# Patient Record
Sex: MALE | Race: WHITE | NOT HISPANIC OR LATINO | URBAN - METROPOLITAN AREA
[De-identification: names, ages, dates, MRNs, and addresses within clinical notes are randomized per-mention and may not be internally consistent; named-entity substitution may affect disease eponyms.]

---

## 2017-10-19 ENCOUNTER — IMPORTED ENCOUNTER (OUTPATIENT)
Dept: URBAN - METROPOLITAN AREA CLINIC 1 | Facility: CLINIC | Age: 37
End: 2017-10-19

## 2017-10-19 PROBLEM — H52.13: Noted: 2017-10-19

## 2017-10-19 PROCEDURE — S0621 ROUTINE OPHTHALMOLOGICAL EXA: HCPCS

## 2017-10-19 NOTE — PATIENT DISCUSSION
1. Myopia: Rx was given for correction if indicated and requested. Return for an appointment in 1 year 40/cc with Dr. Nichole Mesa.

## 2019-04-10 ENCOUNTER — IMPORTED ENCOUNTER (OUTPATIENT)
Dept: URBAN - METROPOLITAN AREA CLINIC 1 | Facility: CLINIC | Age: 39
End: 2019-04-10

## 2019-04-10 PROBLEM — H52.13: Noted: 2019-04-10

## 2019-04-10 PROBLEM — H11.151: Noted: 2019-04-10

## 2019-04-10 PROCEDURE — S0621 ROUTINE OPHTHALMOLOGICAL EXA: HCPCS

## 2019-04-10 NOTE — PATIENT DISCUSSION
1. Myopia: Rx was given for correction if indicated and requested. 2. Pinguecula: Use of sunglasses when exposed to UV light and observation is recommended. Finalized CTL Rx today. Return for an appointment in 1 year for a 36  with Dr. Lynn Johnson.

## 2020-01-22 ENCOUNTER — IMPORTED ENCOUNTER (OUTPATIENT)
Dept: URBAN - METROPOLITAN AREA CLINIC 1 | Facility: CLINIC | Age: 40
End: 2020-01-22

## 2020-01-22 PROBLEM — H52.13: Noted: 2020-01-22

## 2020-01-22 PROCEDURE — S0621 ROUTINE OPHTHALMOLOGICAL EXA: HCPCS

## 2020-01-22 NOTE — PATIENT DISCUSSION
1. Myopia: Rx was given for correction if indicated and requested. 2. Pinguecula: Use of sunglasses when exposed to UV light and observation is recommended. Finalized CTL Rx today. Return for an appointment in 1 year 40/cc with Dr. Raymond Connor.

## 2021-06-21 ENCOUNTER — IMPORTED ENCOUNTER (OUTPATIENT)
Dept: URBAN - METROPOLITAN AREA CLINIC 1 | Facility: CLINIC | Age: 41
End: 2021-06-21

## 2021-06-21 PROBLEM — H52.222: Noted: 2021-06-21

## 2021-06-21 PROBLEM — H44.23: Noted: 2021-06-21

## 2021-06-21 PROBLEM — H52.223: Noted: 2021-06-21

## 2021-06-21 PROCEDURE — S0621 ROUTINE OPHTHALMOLOGICAL EXA: HCPCS

## 2021-06-21 NOTE — PATIENT DISCUSSION
1.  High Myopia w/ Astigmatism OU -- Rx was given for correction if indicated and requested. 2. Pinguecula OU -- Recommended wearing sunglasses when exposed to UV Light. Finalized CTL Rx and given to patient ([employer])Return for an appointment in 1 year 40/cc with Dr. Erlinda Sesay.

## 2022-04-02 ASSESSMENT — VISUAL ACUITY
OS_SC: 20/20
OD_SC: 20/20
OD_SC: 20/20
OS_SC: 20/20
OD_CC: J1+
OD_SC: 20/20
OS_SC: 20/20
OS_CC: J1+
OS_SC: 20/20
OD_SC: 20/20

## 2022-04-02 ASSESSMENT — TONOMETRY
OS_IOP_MMHG: 19
OS_IOP_MMHG: 18
OD_IOP_MMHG: 17
OS_IOP_MMHG: 18
OD_IOP_MMHG: 18
OD_IOP_MMHG: 19
OS_IOP_MMHG: 18

## 2023-01-13 ENCOUNTER — COMPREHENSIVE EXAM (OUTPATIENT)
Dept: URBAN - METROPOLITAN AREA CLINIC 1 | Facility: CLINIC | Age: 43
End: 2023-01-13

## 2023-01-13 DIAGNOSIS — H52.223: ICD-10-CM

## 2023-01-13 DIAGNOSIS — H44.23: ICD-10-CM

## 2023-01-13 PROCEDURE — 92014 COMPRE OPH EXAM EST PT 1/>: CPT

## 2023-01-13 PROCEDURE — 92015 DETERMINE REFRACTIVE STATE: CPT

## 2023-01-13 PROCEDURE — 92310 CONTACT LENS FITTING OU: CPT

## 2023-01-13 ASSESSMENT — TONOMETRY
OS_IOP_MMHG: 17
OD_IOP_MMHG: 17

## 2023-01-13 ASSESSMENT — VISUAL ACUITY
OD_CC: 20/20
OS_CC: J1+
OS_CC: 20/20
OD_CC: J1+

## 2023-10-27 ENCOUNTER — COMPREHENSIVE EXAM (OUTPATIENT)
Dept: URBAN - METROPOLITAN AREA CLINIC 2 | Facility: CLINIC | Age: 43
End: 2023-10-27

## 2023-10-27 DIAGNOSIS — H52.222: ICD-10-CM

## 2023-10-27 DIAGNOSIS — H44.23: ICD-10-CM

## 2023-10-27 DIAGNOSIS — H04.123: ICD-10-CM

## 2023-10-27 DIAGNOSIS — H11.153: ICD-10-CM

## 2023-10-27 PROCEDURE — 92014 COMPRE OPH EXAM EST PT 1/>: CPT

## 2023-10-27 PROCEDURE — 92310-2 LEVEL 2 CONTACT LENS MANAGEMENT

## 2023-10-27 PROCEDURE — 92015 DETERMINE REFRACTIVE STATE: CPT

## 2023-10-27 ASSESSMENT — KERATOMETRY
OS_K2POWER_DIOPTERS: 45.75
OS_AXISANGLE2_DEGREES: 075
OS_AXISANGLE_DEGREES: 165
OS_K1POWER_DIOPTERS: 43.50
OD_K1POWER_DIOPTERS: 43.75
OD_AXISANGLE2_DEGREES: 105
OD_K2POWER_DIOPTERS: 45.50
OD_AXISANGLE_DEGREES: 15

## 2023-10-27 ASSESSMENT — VISUAL ACUITY
OS_CC: 20/25-2
OD_CC: 20/20

## 2023-10-27 ASSESSMENT — TONOMETRY
OD_IOP_MMHG: 18
OS_IOP_MMHG: 16

## 2023-11-06 ENCOUNTER — CONTACT LENSES/GLASSES VISIT (OUTPATIENT)
Dept: URBAN - METROPOLITAN AREA CLINIC 2 | Facility: CLINIC | Age: 43
End: 2023-11-06

## 2023-11-06 DIAGNOSIS — H52.222: ICD-10-CM

## 2023-11-06 DIAGNOSIS — H44.23: ICD-10-CM

## 2023-11-06 PROCEDURE — 92310-F CONTACT LENS FITTING FOLLOW UP

## 2023-11-06 ASSESSMENT — KERATOMETRY
OD_AXISANGLE_DEGREES: 15
OD_AXISANGLE2_DEGREES: 105
OD_K2POWER_DIOPTERS: 45.50
OD_K1POWER_DIOPTERS: 43.75
OS_AXISANGLE2_DEGREES: 075
OS_K2POWER_DIOPTERS: 45.75
OS_K1POWER_DIOPTERS: 43.50
OS_AXISANGLE_DEGREES: 165

## 2023-11-06 ASSESSMENT — VISUAL ACUITY
OD_CC: J1
OS_CC: J1
OD_CC: 20/20
OS_CC: 20/20

## 2025-03-03 ENCOUNTER — COMPREHENSIVE EXAM (OUTPATIENT)
Age: 45
End: 2025-03-03

## 2025-03-03 DIAGNOSIS — Z01.00: ICD-10-CM

## 2025-03-03 DIAGNOSIS — H52.4: ICD-10-CM

## 2025-03-03 DIAGNOSIS — H52.222: ICD-10-CM

## 2025-03-03 DIAGNOSIS — H44.23: ICD-10-CM

## 2025-03-03 DIAGNOSIS — Z46.0: ICD-10-CM

## 2025-03-03 PROCEDURE — 92014 COMPRE OPH EXAM EST PT 1/>: CPT

## 2025-03-03 PROCEDURE — 92015 DETERMINE REFRACTIVE STATE: CPT
